# Patient Record
Sex: MALE | Race: WHITE
[De-identification: names, ages, dates, MRNs, and addresses within clinical notes are randomized per-mention and may not be internally consistent; named-entity substitution may affect disease eponyms.]

---

## 2020-06-04 NOTE — PREOP
ADMISSION DATE:  06/09/2020

 

SURGICAL DATE:  06/09/2020, right cataract removal and intraocular lens

placement; 06/23/2020, left cataract removal and intraocular lens placement.

 

SITE:  [facility].

 

CLINICAL INDICATION:  Impaired vision.

 

ANESTHESIA:  Local with anesthesia standby.

 

PROVIDER OF RECORD:  Junie Urrutia MD, Ophthalmology.

 

CONSULTING PHYSICIAN:  Aly Quevedo MD, Family Medicine.

 

HISTORY OF PRESENT ILLNESS:  Gerard Ramos is an 87-year-old  

male who was seen at [facility] on 06/04/2020 for preop examination.

Scheduled for upcoming cataract surgery, right eye on 06/09/2020, left eye

06/23/2020 for refractive impairments.  Please see Dr. Urrutia's accompanying

notes.

 

Risks and benefits per Dr. Urrutia.

 

MEDICATIONS:  Daily medications include:

1. Metformin 500 mg 1 p.o. b.i.d., NIDDM.

2. P.r.n. Aleve.

 

ALLERGIES:  Gentamicin.

 

PAST HEALTH:  Surgically include left total knee arthroplasty for DJD, right hip

fracture with total hip arthroplasty, and previous toe arthritis surgery.  No

other operative procedures, hospitalizations, unusual childhood diseases, major

injuries, or fractures.  He is on oral therapy for diabetes with good control.

 

SOCIAL HISTORY:   since October 2019.  Retired, had been in the oil

industry.  Three children, 2 boys and 1 girl.  Smoked until 1966, 2 to 3 alcohol

drinks per week.  No smoking, no vaping, no chewing tobacco.

 

FAMILY HISTORY:  Noncontributory.

 

REVIEW OF SYSTEMS:  Other than HPI, feeling well.  Few aches and pains, but

doing well.

 

PHYSICAL EXAMINATION:  VITAL SIGNS:  98.5, 130/72, weight 207, respirations 86

and regular, respirations 16.  GENERAL:  Elderly gentleman, cooperative,

conversant, appears his stated age.  Spry for age.  HEENT:  Funduscopic benign.

Revealed lens opacities.  Normal conjunctivae.  Bright tympanic membranes.

Decreased hearing.  Clear nasal discharge.  Mouth and oropharynx clear.  Good

dentition for age.  Tongue midline.  Good gag reflex.  No loose teeth.  NECK:

Benign.  Thyroid small.  CHEST:  Clear in all lung fields.  No adventitious

sounds.  HEART:  No ectopy or murmur on auscultation.  BREASTS:  Normal male

breasts.  ABDOMEN:  Benign.  No hepatosplenomegaly.  No surgical scars.

GENITOURINARY:  Not indicated.  RECTAL:  Not indicated.

 

LABORATORY STUDIES:  Not indicated.

 

ASSESSMENT:  Preoperative examination for upcoming cataract surgery.  No

contraindications.

 

SECONDARY DIAGNOSES:

1. Right hip fracture with total hip arthroplasty.

2. Total knee arthroplasty degenerative joint disease.

3. Diabetes mellitus.

4. Gentamicin allergy.

 

PLAN:  Risks, benefits, and expectations per Dr. Urrutia.  No contraindications.

Medications can be held in the morning of procedure.

 

Job#: [id number]

DD: 06/04/2020 1112

DT: 06/04/2020 1217 /MODL

## 2020-06-09 ENCOUNTER — HOSPITAL ENCOUNTER (OUTPATIENT)
Dept: HOSPITAL 7 - FB.SDS | Age: 85
Discharge: HOME | End: 2020-06-09
Attending: OPHTHALMOLOGY
Payer: MEDICARE

## 2020-06-09 VITALS — HEART RATE: 78 BPM

## 2020-06-09 VITALS — DIASTOLIC BLOOD PRESSURE: 83 MMHG | SYSTOLIC BLOOD PRESSURE: 159 MMHG

## 2020-06-09 DIAGNOSIS — Z96.641: ICD-10-CM

## 2020-06-09 DIAGNOSIS — H25.813: ICD-10-CM

## 2020-06-09 DIAGNOSIS — H35.30: ICD-10-CM

## 2020-06-09 DIAGNOSIS — Z79.84: ICD-10-CM

## 2020-06-09 DIAGNOSIS — Z88.1: ICD-10-CM

## 2020-06-09 DIAGNOSIS — Z96.652: ICD-10-CM

## 2020-06-09 DIAGNOSIS — Z87.891: ICD-10-CM

## 2020-06-09 DIAGNOSIS — E11.36: Primary | ICD-10-CM

## 2020-06-09 PROCEDURE — V2632 POST CHMBR INTRAOCULAR LENS: HCPCS

## 2020-06-09 PROCEDURE — 66984 XCAPSL CTRC RMVL W/O ECP: CPT

## 2020-06-10 NOTE — OR
DATE OF OPERATION:  06/09/2020

 

SURGEON:  Junie Urrutia MD

 

PREOPERATIVE DIAGNOSIS:  Visually significant cataract, right eye.

 

POSTOPERATIVE DIAGNOSIS:  Visually significant cataract, right eye.

 

PROCEDURES PERFORMED:  Phacoemulsification with intraocular lens placement,

right eye.

 

ASSISTANTS:  None.

 

ANESTHESIA:  Local with sedation.

 

COMPLICATIONS:  None.

 

BLOOD LOSS:  None.

 

IMPLANTS:  Esau ACU0T0 22.0 diopter lens implanted.

 

CDE:  3.82.

 

DESCRIPTION OF PROCEDURE:  After risks and benefits were reviewed with the

patient, consent was obtained in the preoperative area, and the operative eye

was marked with a surgical pen.  In the preoperative area, a pledget was used to

dilate the pupil consisting of a mixture of phenylephrine 10%, cyclopentolate

2%, moxifloxacin 0.5%, and bupivacaine 0.75%.  The patient was taken to the

operating room, where a time-out was performed, and the patient was placed under

monitored anesthesia care.  Topical tetracaine was used for anesthesia.

 

 

The operative eye was prepped and draped for ophthalmic surgery, and the

microscope was brought into position and focused.  A paracentesis incision was

made, followed by injection of preservative-free 1% lidocaine into the anterior

chamber, followed by injection of Viscoat into the anterior chamber.  A

microkeratome blade was used to make a corneal limbal incision temporally.  A

cystotome was used to make the beginning of the capsulorrhexis, which was

carried around 360 degrees in a curvilinear fashion using Utrata forceps.  A

Calderon cannula with BSS was used to hydrodissect and hydrodelineate the nucleus.

The nucleus was removed in a divide and conquer manner using

phacoemulsification.  Irrigation and aspiration were used to remove the

remaining cortical material.  Provisc was used to inflate the capsular bag, and

a pre-loaded Esau ACU0T0 22.0 diopter lens, serial number [id number] was

injected into the capsular bag.  A Sinskey hook was used to position and center

the lens.

 

 

Next, irrigation and aspiration was used to remove any remaining viscoelastic

and cortical material from the anterior chamber.  BSS on a cannula was used to

inflate the anterior chamber and hydrate the wound.  The wound was checked and

found to be watertight.  1 mg of Moxifloxacin was injected into the anterior

chamber.  Drapes were removed and the eye was cleaned.  A drop of brimonidine

0.15% and a drop of TobraDex was placed.  The eye was shielded, and the patient

was taken to the recovery room in stable condition.

 

CC:  MD LOLIS MEJÍA OD

 

Job#: [id number]

DD: 06/09/2020 1109

DT: 06/09/2020 1538 AK/KEILY

## 2020-06-23 ENCOUNTER — HOSPITAL ENCOUNTER (OUTPATIENT)
Dept: HOSPITAL 7 - FB.SDS | Age: 85
Discharge: HOME | End: 2020-06-23
Attending: OPHTHALMOLOGY
Payer: MEDICARE

## 2020-06-23 VITALS — HEART RATE: 82 BPM | SYSTOLIC BLOOD PRESSURE: 150 MMHG | DIASTOLIC BLOOD PRESSURE: 76 MMHG

## 2020-06-23 DIAGNOSIS — Z88.1: ICD-10-CM

## 2020-06-23 DIAGNOSIS — H35.30: ICD-10-CM

## 2020-06-23 DIAGNOSIS — Z96.641: ICD-10-CM

## 2020-06-23 DIAGNOSIS — Z98.41: ICD-10-CM

## 2020-06-23 DIAGNOSIS — E11.36: Primary | ICD-10-CM

## 2020-06-23 DIAGNOSIS — Z87.891: ICD-10-CM

## 2020-06-23 DIAGNOSIS — H25.812: ICD-10-CM

## 2020-06-23 DIAGNOSIS — Z96.652: ICD-10-CM

## 2020-06-23 PROCEDURE — 00142 ANES PX ON EYE LENS SURGERY: CPT

## 2020-06-23 PROCEDURE — V2632 POST CHMBR INTRAOCULAR LENS: HCPCS

## 2020-06-23 PROCEDURE — 82962 GLUCOSE BLOOD TEST: CPT

## 2020-06-23 PROCEDURE — 66984 XCAPSL CTRC RMVL W/O ECP: CPT

## 2020-06-23 NOTE — OR
DATE OF OPERATION:  06/23/2020

 

SURGEON:  Junie Urrutia MD

 

PREOPERATIVE DIAGNOSIS:  Visually significant cataract, left eye.

 

POSTOPERATIVE DIAGNOSIS:  Visually significant cataract, left eye.

 

PROCEDURES PERFORMED:  Phacoemulsification with intraocular lens placement, left

eye.

 

ASSISTANTS:  None.

 

ANESTHESIA:  Local with sedation.

 

COMPLICATIONS:  None.

 

BLOOD LOSS:  None.

 

IMPLANTS:  Esau ACU0T0 23.0 diopter lens implanted.

 

CDE:  2.37.

 

DESCRIPTION OF PROCEDURE:  After risks and benefits were reviewed with the

patient, consent was obtained in the preoperative area, and the operative eye

was marked with a surgical pen.  In the preoperative area, a pledget was used to

dilate the pupil consisting of a mixture of phenylephrine 10%, cyclopentolate

2%, moxifloxacin 0.5%, and bupivacaine 0.75%.  The patient was taken to the

operating room, where a time-out was performed, and the patient was placed under

monitored anesthesia care.  Topical tetracaine was used for anesthesia.

 

The operative eye was prepped and draped for ophthalmic surgery, and the

microscope was brought into position and focused.  A paracentesis incision was

made, followed by injection of preservative-free 1% lidocaine into the anterior

chamber, followed by injection of Viscoat into the anterior chamber.  A

microkeratome blade was used to make a corneal limbal incision temporally.  A

cystotome was used to make the beginning of the capsulorrhexis, which was

carried around 360 degrees in a curvilinear fashion using Utrata forceps.  A

Calderon cannula with BSS was used to hydrodissect and hydrodelineate the nucleus.

The nucleus was removed in a divide and conquer manner using

phacoemulsification.  Irrigation and aspiration were used to remove the

remaining cortical material.  Provisc was used to inflate the capsular bag, and

a pre-loaded Esau ACU0T0 23.0 diopter lens, serial #[id number] was injected

into the capsular bag.  A Sinskey hook was used to position and center the lens.

 

Next, irrigation and aspiration was used to remove any remaining viscoelastic

and cortical material from the anterior chamber.  BSS on a cannula was used to

inflate the anterior chamber and hydrate the wound.  The wound was checked and

found to be watertight.  1 mg of Moxifloxacin was injected into the anterior

chamber.  Drapes were removed and the eye was cleaned.  A drop of brimonidine

0.15% and a drop of TobraDex was placed.  The eye was shielded, and the patient

was taken to the recovery room in stable condition.

 

CC:  CATALINA CONTRERAS MD

 

Job#: [id number]

DD: 06/23/2020 0920

DT: 06/23/2020 1110 AK/KEILY

## 2021-05-30 ENCOUNTER — RECORDS - HEALTHEAST (OUTPATIENT)
Dept: ADMINISTRATIVE | Facility: CLINIC | Age: 86
End: 2021-05-30